# Patient Record
Sex: MALE
[De-identification: names, ages, dates, MRNs, and addresses within clinical notes are randomized per-mention and may not be internally consistent; named-entity substitution may affect disease eponyms.]

---

## 2021-05-11 PROBLEM — Z00.00 ENCOUNTER FOR PREVENTIVE HEALTH EXAMINATION: Status: ACTIVE | Noted: 2021-05-11

## 2021-05-14 ENCOUNTER — APPOINTMENT (OUTPATIENT)
Dept: UROLOGY | Facility: CLINIC | Age: 56
End: 2021-05-14
Payer: COMMERCIAL

## 2021-05-14 VITALS
BODY MASS INDEX: 24.34 KG/M2 | TEMPERATURE: 97.8 F | WEIGHT: 170 LBS | DIASTOLIC BLOOD PRESSURE: 91 MMHG | HEIGHT: 70 IN | HEART RATE: 102 BPM | SYSTOLIC BLOOD PRESSURE: 131 MMHG

## 2021-05-14 VITALS — DIASTOLIC BLOOD PRESSURE: 84 MMHG | SYSTOLIC BLOOD PRESSURE: 131 MMHG

## 2021-05-14 DIAGNOSIS — Z84.1 FAMILY HISTORY OF DISORDERS OF KIDNEY AND URETER: ICD-10-CM

## 2021-05-14 DIAGNOSIS — Z80.42 FAMILY HISTORY OF MALIGNANT NEOPLASM OF PROSTATE: ICD-10-CM

## 2021-05-14 PROCEDURE — 99204 OFFICE O/P NEW MOD 45 MIN: CPT

## 2021-05-14 PROCEDURE — 99072 ADDL SUPL MATRL&STAF TM PHE: CPT

## 2021-05-17 LAB
PSA FREE FLD-MCNC: 27 %
PSA FREE SERPL-MCNC: 0.52 NG/ML
PSA SERPL-MCNC: 1.94 NG/ML

## 2022-05-11 ENCOUNTER — APPOINTMENT (OUTPATIENT)
Dept: UROLOGY | Facility: CLINIC | Age: 57
End: 2022-05-11
Payer: COMMERCIAL

## 2022-05-11 VITALS
DIASTOLIC BLOOD PRESSURE: 86 MMHG | SYSTOLIC BLOOD PRESSURE: 134 MMHG | HEART RATE: 64 BPM | TEMPERATURE: 97.6 F | OXYGEN SATURATION: 99 %

## 2022-05-11 PROCEDURE — 99213 OFFICE O/P EST LOW 20 MIN: CPT

## 2022-05-12 ENCOUNTER — NON-APPOINTMENT (OUTPATIENT)
Age: 57
End: 2022-05-12

## 2022-05-13 NOTE — ASSESSMENT
[FreeTextEntry1] : \par \par Impression/plan: 57-year-old gentleman here today for prostate cancer screening, with significant family history of prostate cancer. He is overall happy with his voiding pattern. PSA 2021 1.94. \par \par 1. PSA today, if within normal limits follow-up in 1 year for annual screening.\par 2. He is happy with his voiding pattern, does not want any intervention.

## 2022-05-13 NOTE — PHYSICAL EXAM
[General Appearance - Well Developed] : well developed [General Appearance - Well Nourished] : well nourished [Normal Appearance] : normal appearance [Well Groomed] : well groomed [General Appearance - In No Acute Distress] : no acute distress [Abdomen Soft] : soft [Abdomen Tenderness] : non-tender [Costovertebral Angle Tenderness] : no ~M costovertebral angle tenderness [No Prostate Nodules] : no prostate nodules [Prostate Size ___ gm] : prostate size [unfilled] gm [Edema] : no peripheral edema [] : no respiratory distress [Respiration, Rhythm And Depth] : normal respiratory rhythm and effort [Exaggerated Use Of Accessory Muscles For Inspiration] : no accessory muscle use [Oriented To Time, Place, And Person] : oriented to person, place, and time [Normal Station and Gait] : the gait and station were normal for the patient's age

## 2022-05-13 NOTE — LETTER BODY
[Dear  ___] : Dear  [unfilled], [Consult Letter:] : I had the pleasure of evaluating your patient, [unfilled]. [Please see my note below.] : Please see my note below. [Consult Closing:] : Thank you very much for allowing me to participate in the care of this patient.  If you have any questions, please do not hesitate to contact me. [Sincerely,] : Sincerely, [FreeTextEntry3] : Kalee Bansal MD\par System Director [facility]\par Department of Urology\par [facility] \par   at [facility]\par  of Urology\par [facility] [employer] at [facility]\par

## 2022-05-13 NOTE — HISTORY OF PRESENT ILLNESS
[FreeTextEntry1] : 57-year-old gentleman presents today for prostate cancer screening. He does have a family history of prostate cancer, his father, uncle and grandfather all have prostate cancer. He does have some mild slowing of the stream otherwise denies any other irritative obstructive voiding symptoms. He has occasional nocturia x1, but does note that he drinks beer prior to going to bed. He is otherwise without any other  complaints.\par \par PVR - 6 ml. \par \par 5/11/22\par \par Patient doing well, no interval change in med/surg history. He has started a statin. PSA 2021 1.94. Voiding pattern remains unchanged. \par \par

## 2022-06-03 LAB
PSA FREE FLD-MCNC: 30 %
PSA FREE SERPL-MCNC: 0.57 NG/ML
PSA SERPL-MCNC: 1.91 NG/ML

## 2023-05-15 ENCOUNTER — NON-APPOINTMENT (OUTPATIENT)
Age: 58
End: 2023-05-15

## 2023-05-16 ENCOUNTER — APPOINTMENT (OUTPATIENT)
Dept: UROLOGY | Facility: CLINIC | Age: 58
End: 2023-05-16
Payer: COMMERCIAL

## 2023-05-16 VITALS
HEART RATE: 64 BPM | WEIGHT: 170 LBS | SYSTOLIC BLOOD PRESSURE: 120 MMHG | TEMPERATURE: 97.2 F | OXYGEN SATURATION: 98 % | DIASTOLIC BLOOD PRESSURE: 82 MMHG | BODY MASS INDEX: 25.18 KG/M2 | HEIGHT: 69 IN

## 2023-05-16 PROCEDURE — 99213 OFFICE O/P EST LOW 20 MIN: CPT

## 2023-05-18 LAB
PSA FREE FLD-MCNC: 28 %
PSA FREE SERPL-MCNC: 0.55 NG/ML
PSA SERPL-MCNC: 1.98 NG/ML

## 2023-05-18 NOTE — HISTORY OF PRESENT ILLNESS
[FreeTextEntry1] : 57-year-old gentleman presents today for prostate cancer screening. He does have a family history of prostate cancer, his father, uncle and grandfather all have prostate cancer. He does have some mild slowing of the stream otherwise denies any other irritative obstructive voiding symptoms. He has occasional nocturia x1, but does note that he drinks beer prior to going to bed. He is otherwise without any other  complaints.\par \par PVR - 6 ml. \par \par 5/11/22\par \par Patient doing well, no interval change in med/surg history. He has started a statin. PSA 2021 1.94. Voiding pattern remains unchanged. \par \par 5/16/23\par \par Patient gentleman who is here today for follow-up prostate cancer screening.  He denies any change in his voiding pattern.  He denies any change in medical history since his last office visit.  His postvoid residual was unremarkable at his last office visit.  His PSAs have ranged in the 1.9 range, last one 1.91 in 2022.

## 2023-05-18 NOTE — PHYSICAL EXAM
[General Appearance - Well Developed] : well developed [General Appearance - Well Nourished] : well nourished [Normal Appearance] : normal appearance [Well Groomed] : well groomed [General Appearance - In No Acute Distress] : no acute distress [Prostate Tenderness] : the prostate was not tender [No Prostate Nodules] : no prostate nodules [Prostate Size ___ gm] : prostate size [unfilled] gm [] : no respiratory distress [Respiration, Rhythm And Depth] : normal respiratory rhythm and effort [Exaggerated Use Of Accessory Muscles For Inspiration] : no accessory muscle use [Oriented To Time, Place, And Person] : oriented to person, place, and time [Normal Station and Gait] : the gait and station were normal for the patient's age

## 2023-05-18 NOTE — ASSESSMENT
[FreeTextEntry1] : \par \par Impression/plan: 58-year-old gentleman here today for prostate cancer screening, with significant family history of prostate cancer. He is overall happy with his voiding pattern. PSA 2022 1.91. \par \par 1. PSA today, if within normal limits follow-up in 1 year for annual screening.\par 2. He is happy with his voiding pattern, does not want any intervention. \par

## 2024-05-14 ENCOUNTER — APPOINTMENT (OUTPATIENT)
Dept: UROLOGY | Facility: CLINIC | Age: 59
End: 2024-05-14

## 2024-05-15 ENCOUNTER — APPOINTMENT (OUTPATIENT)
Dept: UROLOGY | Facility: CLINIC | Age: 59
End: 2024-05-15
Payer: COMMERCIAL

## 2024-05-15 DIAGNOSIS — R39.198 OTHER DIFFICULTIES WITH MICTURITION: ICD-10-CM

## 2024-05-15 PROCEDURE — 99213 OFFICE O/P EST LOW 20 MIN: CPT

## 2024-05-15 NOTE — HISTORY OF PRESENT ILLNESS
[FreeTextEntry1] : 57-year-old gentleman presents today for prostate cancer screening. He does have a family history of prostate cancer, his father, uncle and grandfather all have prostate cancer. He does have some mild slowing of the stream otherwise denies any other irritative obstructive voiding symptoms. He has occasional nocturia x1, but does note that he drinks beer prior to going to bed. He is otherwise without any other  complaints.  PVR - 6 ml.   5/11/22  Patient doing well, no interval change in med/surg history. He has started a statin. PSA 2021 1.94. Voiding pattern remains unchanged.   5/16/23  Patient gentleman who is here today for follow-up prostate cancer screening.  He denies any change in his voiding pattern.  He denies any change in medical history since his last office visit.  His postvoid residual was unremarkable at his last office visit.  His PSAs have ranged in the 1.9 range, last one 1.91 in 2022.  5/15/24  destiny - 35 gms, wnl  plan: psa f/u 1 yr

## 2024-05-20 PROBLEM — R39.198 URINARY STREAM SLOWING: Status: ACTIVE | Noted: 2021-05-14

## 2024-05-24 LAB
PSA FREE FLD-MCNC: 24 %
PSA FREE SERPL-MCNC: 0.87 NG/ML
PSA SERPL-MCNC: 3.6 NG/ML

## 2024-06-13 DIAGNOSIS — Z12.5 ENCOUNTER FOR SCREENING FOR MALIGNANT NEOPLASM OF PROSTATE: ICD-10-CM

## 2024-06-14 LAB
PSA FREE FLD-MCNC: 25 %
PSA FREE SERPL-MCNC: 0.6 NG/ML
PSA SERPL-MCNC: 2.4 NG/ML

## 2025-03-20 ENCOUNTER — NON-APPOINTMENT (OUTPATIENT)
Age: 60
End: 2025-03-20

## 2025-03-24 ENCOUNTER — NON-APPOINTMENT (OUTPATIENT)
Age: 60
End: 2025-03-24

## 2025-03-24 ENCOUNTER — APPOINTMENT (OUTPATIENT)
Dept: ENDOCRINOLOGY | Facility: CLINIC | Age: 60
End: 2025-03-24
Payer: COMMERCIAL

## 2025-03-24 VITALS
DIASTOLIC BLOOD PRESSURE: 76 MMHG | HEIGHT: 69 IN | HEART RATE: 65 BPM | SYSTOLIC BLOOD PRESSURE: 134 MMHG | BODY MASS INDEX: 26.07 KG/M2 | WEIGHT: 176 LBS

## 2025-03-24 DIAGNOSIS — R73.03 PREDIABETES.: ICD-10-CM

## 2025-03-24 DIAGNOSIS — Z78.9 OTHER SPECIFIED HEALTH STATUS: ICD-10-CM

## 2025-03-24 DIAGNOSIS — E03.9 HYPOTHYROIDISM, UNSPECIFIED: ICD-10-CM

## 2025-03-24 DIAGNOSIS — F12.90 CANNABIS USE, UNSPECIFIED, UNCOMPLICATED: ICD-10-CM

## 2025-03-24 PROCEDURE — 99204 OFFICE O/P NEW MOD 45 MIN: CPT

## 2025-03-24 RX ORDER — MINOXIDIL 2.5 MG/1
TABLET ORAL
Refills: 0 | Status: ACTIVE | COMMUNITY

## 2025-03-24 RX ORDER — LEVOTHYROXINE SODIUM 0.05 MG/1
50 TABLET ORAL DAILY
Qty: 1 | Refills: 1 | Status: ACTIVE | COMMUNITY
Start: 2025-03-24 | End: 1900-01-01

## 2025-03-24 RX ORDER — ATORVASTATIN CALCIUM 40 MG/1
40 TABLET, FILM COATED ORAL
Refills: 0 | Status: ACTIVE | COMMUNITY

## 2025-03-31 ENCOUNTER — NON-APPOINTMENT (OUTPATIENT)
Age: 60
End: 2025-03-31

## 2025-04-01 ENCOUNTER — APPOINTMENT (OUTPATIENT)
Dept: UROLOGY | Facility: CLINIC | Age: 60
End: 2025-04-01
Payer: COMMERCIAL

## 2025-04-01 VITALS
OXYGEN SATURATION: 99 % | SYSTOLIC BLOOD PRESSURE: 125 MMHG | DIASTOLIC BLOOD PRESSURE: 75 MMHG | TEMPERATURE: 97.6 F | HEART RATE: 67 BPM

## 2025-04-01 DIAGNOSIS — R97.20 ELEVATED PROSTATE, SPECIFIC ANTIGEN [PSA]: ICD-10-CM

## 2025-04-01 PROCEDURE — 99212 OFFICE O/P EST SF 10 MIN: CPT

## 2025-05-06 ENCOUNTER — TRANSCRIPTION ENCOUNTER (OUTPATIENT)
Age: 60
End: 2025-05-06

## 2025-05-06 ENCOUNTER — APPOINTMENT (OUTPATIENT)
Dept: UROLOGY | Facility: CLINIC | Age: 60
End: 2025-05-06
Payer: COMMERCIAL

## 2025-05-06 VITALS
DIASTOLIC BLOOD PRESSURE: 74 MMHG | SYSTOLIC BLOOD PRESSURE: 138 MMHG | TEMPERATURE: 98.1 F | OXYGEN SATURATION: 98 % | HEART RATE: 66 BPM | RESPIRATION RATE: 20 BRPM

## 2025-05-06 DIAGNOSIS — R97.20 ELEVATED PROSTATE, SPECIFIC ANTIGEN [PSA]: ICD-10-CM

## 2025-05-06 PROCEDURE — 99212 OFFICE O/P EST SF 10 MIN: CPT

## 2025-05-07 ENCOUNTER — TRANSCRIPTION ENCOUNTER (OUTPATIENT)
Age: 60
End: 2025-05-07

## 2025-05-07 LAB
PSA FREE FLD-MCNC: 20 %
PSA FREE SERPL-MCNC: 0.5 NG/ML
PSA SERPL-MCNC: 2.52 NG/ML

## 2025-05-12 ENCOUNTER — TRANSCRIPTION ENCOUNTER (OUTPATIENT)
Age: 60
End: 2025-05-12

## 2025-05-12 DIAGNOSIS — Z00.00 ENCOUNTER FOR GENERAL ADULT MEDICAL EXAMINATION W/OUT ABNORMAL FINDINGS: ICD-10-CM

## 2025-05-12 DIAGNOSIS — E03.9 HYPOTHYROIDISM, UNSPECIFIED: ICD-10-CM

## 2025-05-13 ENCOUNTER — TRANSCRIPTION ENCOUNTER (OUTPATIENT)
Age: 60
End: 2025-05-13

## 2025-05-14 ENCOUNTER — APPOINTMENT (OUTPATIENT)
Dept: UROLOGY | Facility: CLINIC | Age: 60
End: 2025-05-14

## 2025-05-19 DIAGNOSIS — R73.03 PREDIABETES.: ICD-10-CM

## 2025-05-23 LAB
25(OH)D3 SERPL-MCNC: 25.1 NG/ML
BASOPHILS # BLD AUTO: 0.09 K/UL
BASOPHILS NFR BLD AUTO: 1.2 %
CHOLEST SERPL-MCNC: 140 MG/DL
EOSINOPHIL # BLD AUTO: 0.39 K/UL
EOSINOPHIL NFR BLD AUTO: 5.3 %
ESTIMATED AVERAGE GLUCOSE: 117 MG/DL
HBA1C MFR BLD HPLC: 5.7 %
HCT VFR BLD CALC: 48.2 %
HDLC SERPL-MCNC: 58 MG/DL
HGB BLD-MCNC: 15.3 G/DL
IMM GRANULOCYTES NFR BLD AUTO: 0.3 %
IRON SERPL-MCNC: 137 UG/DL
LDLC SERPL-MCNC: 70 MG/DL
LYMPHOCYTES # BLD AUTO: 2.01 K/UL
LYMPHOCYTES NFR BLD AUTO: 27.5 %
MAN DIFF?: NORMAL
MCHC RBC-ENTMCNC: 27.5 PG
MCHC RBC-ENTMCNC: 31.7 G/DL
MCV RBC AUTO: 86.5 FL
MONOCYTES # BLD AUTO: 0.73 K/UL
MONOCYTES NFR BLD AUTO: 10 %
NEUTROPHILS # BLD AUTO: 4.08 K/UL
NEUTROPHILS NFR BLD AUTO: 55.7 %
NONHDLC SERPL-MCNC: 82 MG/DL
PLATELET # BLD AUTO: 261 K/UL
RBC # BLD: 5.57 M/UL
RBC # FLD: 14.9 %
T3 SERPL-MCNC: 157 NG/DL
T4 FREE SERPL-MCNC: 2 NG/DL
THYROGLOB AB SERPL-ACNC: 408 IU/ML
THYROPEROXIDASE AB SERPL IA-ACNC: 331 IU/ML
TRIGL SERPL-MCNC: 52 MG/DL
TSH SERPL-ACNC: 0.12 UIU/ML
WBC # FLD AUTO: 7.32 K/UL

## 2025-05-27 ENCOUNTER — APPOINTMENT (OUTPATIENT)
Dept: ENDOCRINOLOGY | Facility: CLINIC | Age: 60
End: 2025-05-27
Payer: COMMERCIAL

## 2025-05-27 VITALS
HEART RATE: 70 BPM | SYSTOLIC BLOOD PRESSURE: 127 MMHG | HEIGHT: 69 IN | BODY MASS INDEX: 26.36 KG/M2 | TEMPERATURE: 97.8 F | DIASTOLIC BLOOD PRESSURE: 75 MMHG | OXYGEN SATURATION: 97 % | WEIGHT: 178 LBS

## 2025-05-27 DIAGNOSIS — E03.9 HYPOTHYROIDISM, UNSPECIFIED: ICD-10-CM

## 2025-05-27 PROCEDURE — 99214 OFFICE O/P EST MOD 30 MIN: CPT

## 2025-05-27 RX ORDER — LEVOTHYROXINE SODIUM 0.11 MG/1
112 TABLET ORAL DAILY
Qty: 1 | Refills: 1 | Status: ACTIVE | COMMUNITY
Start: 2025-05-27 | End: 1900-01-01

## 2025-05-29 ENCOUNTER — TRANSCRIPTION ENCOUNTER (OUTPATIENT)
Age: 60
End: 2025-05-29

## 2025-07-08 ENCOUNTER — TRANSCRIPTION ENCOUNTER (OUTPATIENT)
Age: 60
End: 2025-07-08

## 2025-07-09 ENCOUNTER — TRANSCRIPTION ENCOUNTER (OUTPATIENT)
Age: 60
End: 2025-07-09

## 2025-07-14 ENCOUNTER — APPOINTMENT (OUTPATIENT)
Dept: ENDOCRINOLOGY | Facility: CLINIC | Age: 60
End: 2025-07-14
Payer: COMMERCIAL

## 2025-07-14 VITALS
DIASTOLIC BLOOD PRESSURE: 79 MMHG | BODY MASS INDEX: 25.99 KG/M2 | SYSTOLIC BLOOD PRESSURE: 131 MMHG | HEART RATE: 64 BPM | WEIGHT: 176 LBS

## 2025-07-14 PROCEDURE — 99214 OFFICE O/P EST MOD 30 MIN: CPT

## 2025-07-14 PROCEDURE — G2211 COMPLEX E/M VISIT ADD ON: CPT | Mod: NC

## 2025-07-14 RX ORDER — LEVOTHYROXINE SODIUM 0.1 MG/1
100 TABLET ORAL
Qty: 1 | Refills: 1 | Status: ACTIVE | COMMUNITY
Start: 2025-07-14 | End: 1900-01-01

## 2025-07-18 ENCOUNTER — TRANSCRIPTION ENCOUNTER (OUTPATIENT)
Age: 60
End: 2025-07-18

## 2025-07-18 LAB
ALBUMIN SERPL ELPH-MCNC: 4.5 G/DL
ALP BLD-CCNC: 34 U/L
ALT SERPL-CCNC: 37 U/L
ANION GAP SERPL CALC-SCNC: 14 MMOL/L
AST SERPL-CCNC: 33 U/L
BASOPHILS # BLD AUTO: 0.08 K/UL
BASOPHILS NFR BLD AUTO: 1.1 %
BILIRUB SERPL-MCNC: 0.6 MG/DL
BUN SERPL-MCNC: 17 MG/DL
CALCIUM SERPL-MCNC: 9.6 MG/DL
CHLORIDE SERPL-SCNC: 106 MMOL/L
CHOLEST SERPL-MCNC: 154 MG/DL
CO2 SERPL-SCNC: 22 MMOL/L
CREAT SERPL-MCNC: 1.39 MG/DL
CREAT SPEC-SCNC: 294 MG/DL
EGFRCR SERPLBLD CKD-EPI 2021: 59 ML/MIN/1.73M2
EOSINOPHIL # BLD AUTO: 0.31 K/UL
EOSINOPHIL NFR BLD AUTO: 4.3 %
ESTIMATED AVERAGE GLUCOSE: 117 MG/DL
GLUCOSE SERPL-MCNC: 85 MG/DL
HBA1C MFR BLD HPLC: 5.7 %
HCT VFR BLD CALC: 45.8 %
HDLC SERPL-MCNC: 57 MG/DL
HGB BLD-MCNC: 14.4 G/DL
IMM GRANULOCYTES NFR BLD AUTO: 0.1 %
IRON SERPL-MCNC: 122 UG/DL
LDLC SERPL-MCNC: 81 MG/DL
LYMPHOCYTES # BLD AUTO: 1.82 K/UL
LYMPHOCYTES NFR BLD AUTO: 25.5 %
MAN DIFF?: NORMAL
MCHC RBC-ENTMCNC: 27.3 PG
MCHC RBC-ENTMCNC: 31.4 G/DL
MCV RBC AUTO: 86.9 FL
MICROALBUMIN 24H UR DL<=1MG/L-MCNC: <1.2 MG/DL
MICROALBUMIN/CREAT 24H UR-RTO: NORMAL MG/G
MONOCYTES # BLD AUTO: 0.62 K/UL
MONOCYTES NFR BLD AUTO: 8.7 %
NEUTROPHILS # BLD AUTO: 4.31 K/UL
NEUTROPHILS NFR BLD AUTO: 60.3 %
NONHDLC SERPL-MCNC: 97 MG/DL
PLATELET # BLD AUTO: 252 K/UL
POTASSIUM SERPL-SCNC: 4.8 MMOL/L
PROT SERPL-MCNC: 7.1 G/DL
PSA FREE FLD-MCNC: 21 %
PSA FREE SERPL-MCNC: 0.9 NG/ML
PSA SERPL-MCNC: 4.32 NG/ML
RBC # BLD: 5.27 M/UL
RBC # FLD: 14.3 %
SODIUM SERPL-SCNC: 141 MMOL/L
T4 FREE SERPL-MCNC: 1.5 NG/DL
TESTOST FREE SERPL-MCNC: 7.7 PG/ML
TESTOST SERPL-MCNC: 556 NG/DL
THYROGLOB AB SERPL-ACNC: 258 IU/ML
THYROPEROXIDASE AB SERPL IA-ACNC: 275 IU/ML
TRIGL SERPL-MCNC: 84 MG/DL
TSH SERPL-ACNC: 0.09 UIU/ML
WBC # FLD AUTO: 7.15 K/UL

## 2025-09-08 ENCOUNTER — TRANSCRIPTION ENCOUNTER (OUTPATIENT)
Age: 60
End: 2025-09-08